# Patient Record
Sex: MALE
[De-identification: names, ages, dates, MRNs, and addresses within clinical notes are randomized per-mention and may not be internally consistent; named-entity substitution may affect disease eponyms.]

---

## 2019-04-06 ENCOUNTER — HOSPITAL ENCOUNTER (EMERGENCY)
Dept: HOSPITAL 56 - MW.ED | Age: 34
Discharge: HOME | End: 2019-04-06
Payer: COMMERCIAL
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
## 2019-04-06 DIAGNOSIS — S49.92XA: ICD-10-CM

## 2019-04-06 DIAGNOSIS — S89.92XA: Primary | ICD-10-CM
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
## 2019-04-06 NOTE — CR
INDICATION:



Pain.



TECHNIQUE:



Three views of the left shoulder.



FINDINGS:



No acute fracture dislocation. No erosion. Old fracture deformity distal 

3rd left clavicle.



Impression :



Old left clavicular fracture. The examination is otherwise negative.



Dictated by Dave Sifuentes MD @ Apr 6 2019  4:09PM



Signed by Dr. Dave Sifuentes @ Apr 6 2019  4:10PM

## 2019-04-06 NOTE — CR
INDICATION:



Pain.



TECHNIQUE:



Three views of the left knee.



FINDINGS:



No fracture, dislocation, erosion, or effusion.



IMPRESSION:



Normal three-view left knee.



Dictated by Dave Sifuentes MD @ Apr 6 2019  4:09PM



Signed by Dr. Dave Sifuentes @ Apr 6 2019  4:09PM
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
## 2021-06-27 NOTE — CR
Indication:



 Trauma 



Technique:



 Three-views of the left elbow 



Findings :



 Minimally distracted olecranon fracture which is otherwise aligned. Soft 

tissue swelling/defect. Elbow effusion. No additional fractures seen.



Dictated by Dahiana Cornell MD @ 6/27/2021 4:03:28 PM



Signed by Dr. Dahiana Cornell @ Jun 27 2021  4:03PM

## 2021-06-28 NOTE — [id number]
Post Anesthesia Note





- EVALUATION WITHIN 48HRS OF ANESTHETIC


Vital Signs in Normal Range: Yes


Patient Participated in Evaluation: Yes


Respiratory Function Stable: Yes


Airway Patent: Yes


Cardiovascular Function Stable: Yes


Hydration Status Stable: Yes


Pain Control Satisfactory: Yes


Nausea and Vomiting Control Satisfactory: Yes


Mental Status Recovered: Yes


Vital Signs: 


                                Last Vital Signs











Temp  37 C   06/28/21 16:46


 


Pulse  82   06/28/21 16:56


 


Resp  10 L  06/28/21 16:56


 


BP  109/64   06/28/21 16:56


 


Pulse Ox  94 L  06/28/21 16:56

## 2021-06-28 NOTE — PCM.PREANE
Preanesthetic Assessment





- Anesthesia/Transfusion/Family Hx


Anesthesia History: Prior Anesthesia Without Reaction


Family History of Anesthesia Reaction: No


Transfusion History: No Prior Transfusion(s)





- Review of Systems


General: No Symptoms


Pulmonary: No Symptoms


Cardiovascular: No Symptoms


Gastrointestinal: No Symptoms


Neurological: No Symptoms


Other: Reports: None





- Physical Assessment


NPO Status Date: 06/28/21


NPO Status Time: 00:00


Height: 6 ft


Weight: 210 lb


ASA Class: 1


Mental Status: Alert & Oriented x3


Airway Class: Mallampati = 2


Dentition: Reports: Normal Dentition


ROM/Head Extension: Full


Lungs: Clear to Auscultation, Normal Respiratory Effort


Cardiovascular: Regular Rate, Regular Rhythm





- Lab


Values: 





                             Laboratory Last Values











SARS-CoV-2 RNA ([facility])  NEGATIVE  (NEGATIVE)   06/28/21  13:05    














- Allergies


Allergies/Adverse Reactions: 


                                    Allergies











Allergy/AdvReac Type Severity Reaction Status Date / Time


 


No Known Allergies Allergy   Verified 06/28/21 11:14














- Blood


Blood Available: No





- Acknowledgements


Anesthesia Type Planned: General Anesthesia, Regional Block


Pt an Appropriate Candidate for the Planned Anesthesia: Yes


Alternatives and Risks of Anesthesia Discussed w Pt/Guardian: Yes


Pt/Guardian Understands and Agrees with Anesthesia Plan: Yes





PreAnesthesia Questionnaire





- Past Health History


Medical/Surgical History: Denies Medical/Surgical History


HEENT History: Reports: Other (See Below)


Other HEENT History: wears glasses


Cardiovascular History: Reports: None


Respiratory History: Reports: None


Gastrointestinal History: Reports: GERD


Genitourinary History: Reports: None


OB/GYN History: 


Musculoskeletal History: Reports: Fracture


Other Musculoskeletal History: hx fx clavicle, left hand, pelvis, right wrist


Neurological History: Reports: Concussion


Psychiatric History: Reports: None


Endocrine/Metabolic History: Reports: None


Hematologic History: Reports: None


Immunologic History: Reports: None


Oncologic (Cancer) History: Reports: None


Dermatologic History: Reports: None





- Infectious Disease History


Infectious Disease History: Reports: None





- Past Surgical History


Head Surgeries/Procedures: Reports: None


HEENT Surgical History: Reports: None


Cardiovascular Surgical History: Reports: None


Respiratory Surgical History: Reports: None


GI Surgical History: Reports: None


Male  Surgical History: Reports: Vasectomy


Endocrine Surgical History: Reports: None


Neurological Surgical History: Reports: None


Musculoskeletal Surgical History: Reports: Arthroscopic Knee


Other Musculoskeletal Surgeries/Procedures:: [place] ACL reconstruction


Oncologic Surgical History: Reports: None


Dermatological Surgical History: Reports: None





- SUBSTANCE USE


Tobacco Use Status *Q: Never Tobacco User





- HOME MEDS


Home Medications: 


                                    Home Meds





Omeprazole 20 mg PO DAILY 06/27/21 [History]











- CURRENT (IN HOUSE) MEDS


Current Meds: 





                               Current Medications





Albuterol (Albuterol 0.083% 2.5 Mg/3 Ml Neb Soln)  2.5 mg NEB ONETIME PRN


   PRN Reason: Wheezing


Droperidol (Droperidol 5 Mg/2 Ml Sdv)  0.625 mg IVPUSH ONETIME PRN


   PRN Reason: Nausea/Vomiting


Fentanyl (Fentanyl 100 Mcg/2 Ml Sdv)  50 mcg IVPUSH Q5M PRN


   PRN Reason: Pain (mild 1-3)


Hydromorphone HCl (Hydromorphone 2 Mg/Ml Syringe)  0.5 mg IVPUSH Q10M PRN


   PRN Reason: Pain (moderate 4-6)


Lactated Ringer's (Ringers, Lactated)  1,000 mls @ 100 mls/hr IV ASDIRECTED TRACIE


Cefazolin Sodium/Dextrose 2 gm (/ Premix)  50 mls @ 100 mls/hr IV ONCALL TRACIE


Metoclopramide HCl (Metoclopramide 10 Mg/2 Ml Sdv)  10 mg IVPUSH ONETIME PRN


   PRN Reason: Nausea/Vomiting


Morphine Sulfate (Morphine 10 Mg/Ml Syringe)  2 mg IVPUSH Q10M PRN


   PRN Reason: Pain (severe 7-10)


Naloxone HCl (Naloxone 0.4 Mg/Ml Syringe)  0.1 mg IVPUSH ASDIRECTED PRN


   PRN Reason: Respiratory Depression


Ondansetron HCl (Ondansetron 4 Mg/2 Ml Sdv)  4 mg IVPUSH ONETIME PRN


   PRN Reason: Nausea/Vomiting





Discontinued Medications





Bupivacaine HCl (Bupivacaine 0.5% 30 Ml Sdv) Confirm Administered Dose 30 ml 

.ROUTE .STK-MED ONE


   Stop: 06/28/21 11:42


Dexamethasone (Dexamethasone 4 Mg/Ml 5 Ml Mdv) Confirm Administered Dose 20 mg 

.ROUTE .STK-MED ONE


   Stop: 06/28/21 11:43


Fentanyl (Fentanyl 100 Mcg/2 Ml Sdv) Confirm Administered Dose 100 mcg .ROUTE 

.STK-MED ONE


   Stop: 06/28/21 13:32

## 2021-06-28 NOTE — PCM.POSTAN
POST ANESTHESIA ASSESSMENT





- MENTAL STATUS


Mental Status: Alert, Oriented





- VITAL SIGNS


Vital Signs: 


                                Last Vital Signs











Temp  37 C   06/28/21 16:46


 


Pulse  82   06/28/21 16:56


 


Resp  10 L  06/28/21 16:56


 


BP  109/64   06/28/21 16:56


 


Pulse Ox  94 L  06/28/21 16:56














- RESPIRATORY


Respiratory Status: Respiratory Rate WNL, Airway Patent, O2 Saturation Stable





- CARDIOVASCULAR


CV Status: Pulse Rate WNL, Blood Pressure Stable





- GASTROINTESTINAL


GI Status: No Symptoms





- POST OP HYDRATION


Hydration Status: Adequate & Stable

## 2021-06-28 NOTE — PCM.SN.2
- Free Text/Narrative


Note: 





SEE ANESTHESIA RECORD:





After obtaining informed consent from patient, and performing a block timeout, 

patient was minimally sedated with 100mcg fentanyl IV while using full 

monitoring (BP,SPO2, ECG)





Following application of chlorhexidine an US probe was used to identify the 

patients left subclavian artery and the corresponding medial, lateral and 

anterior trunks of the brachial plexus. The skin was localized with 1% lidocaine

and a 20g echogenic block needle advance under direct visualization to a 

position inferior to the subclavian artery. After negative aspiration for blood,

10cc of 0.5% Bupiv was injected in 2cc increments. The needle tip was then 

repositioned superior to the artery and another 10cc Bupivicaine injected in 2cc

increments. Patient tolerated the procedure well and was subsequently taken to 

the OR for ORIF of left elbow.





[id number]





Shukri Barrera MD


6/28/2021

## 2021-06-29 NOTE — CR
INDICATION:



Elbow fracture.



COMPARISON:



X-rays 06/27/2021.



FINDINGS:



Intraoperative fluoroscopic support provided orthopedic service for ORIF of 

previously seen proximal ulna fracture. Alignment is anatomic. Remaining 

osseous structures are intact. A total of 3 seconds fluoroscopy time was 

utilized. Please see the operative note for additional details.



Dictated by Arthur Murray MD @ 6/29/2021 2:50:26 PM



Signed by Dr. Arthur Murray @ Jun 29 2021  2:50PM

## 2024-09-05 NOTE — PCM.OPNOTE
- General Post-Op/Procedure Note


Date of Surgery/Procedure: 06/28/21


Operative Procedure(s): (1) Open reduction and internal fixation of left 

olecranon fracture.  (2) Irrigation and debridement of left grade 2 open 

olecranon fracture


Findings: 


Grade 2 open left olecranon fracture with laceration communicating with the 

fracture site, no gross contamination.


Pre Op Diagnosis: Left grade 2 open olecranon fracture


Post-Op Diagnosis: Left grade 2 open olecranon fracture


Anesthesia Technique: General LMA, Regional Block


Primary Surgeon: Negro Zheng


Assistant: Lottie Ruiz


Reason Assistant Was Necessary: 


Retraction and positioning during surgery


Pathology: 


None


EBL in mLs: 5


Complications: None


Free Text/Narrative:: 


Patient is a 35-year-old right-hand-dominant male who sustained a grade 2 open 

left olecranon fracture doing motocross yesterday evening.  Patient was seen in 

the emergency room and had initial irrigation and debridement of the wound with 

loose closure.  He was made sure that he was up-to-date on his tetanus shot and 

received Ancef as well as ceftriaxone for antibiotics for the open fracture.  He

was placed in a posterior long-arm splint.  He was seen today and medically 

cleared for surgery.  The risks and benefits of surgery were discussed with the 

patient.  All questions were answered.  Patient consented to proceed with 

surgery.





Patient was taken to the operating room after he received a regional block.  YOCASTA zuñiga was placed in a supine position and underwent general anesthesia by LMA. 

He had bolsters placed under his left shoulder back and hip so he was slightly 

elevated on his left side.  The left upper extremity was then scrubbed with 

Betadine and then prepped with Betadine.  The left upper extremities prepped 

draped in usual sterile manner.  The 3 cm open laceration was debrided sharply 

with a scalpel blade.  The soft tissues were debrided sharply with a scalpel 

blade.  The fracture was identified and communicated and was curetted to remove 

any possible foreign body but no gross contamination was noted.  The wound was 

then irrigated with 3000 cc of normal saline solution.





The open wound was distal to the olecranon so that the wound was extended 

proximally past the tip of the olecranon more to the lateral side so was not 

directly posterior over the tip of the olecranon.  The tissues were elevated 

around the fracture site and distally along the shaft to allow for a plate.  A 

drill hole was made distal to the fracture in the lateral aspect of the ulna 

shaft for pointed reduction clamp and the fracture was reduced with compression 

through a reduction clamp.  A 7 hole one third tubular plate was then customized

into a hook plate and placed along the posterior border of the ulna olecranon 

with the spikes placed into the triceps tendon.  A bicortical screw was placed 

in the most distal hole.  A lag screw was then placed in the third proximal hole

across the fracture while it was compressed.  C arm confirmed anatomic reduction

of the fracture.  An additional bicortical screw was placed through the second 

most distal hole.  C arm again confirmed anatomic reduction and good position of

the plate and screws.  Wounds were again irrigated.  The fascia and bursa were 

closed over the plate with interrupted #1 PDS suture.  Skin was closed with 

full-thickness simple and horizontal mattress 3-0 nylon sutures.  Sterile 

dressing was applied and then the patient was replaced in a posterior long-arm 

splint.  Patient was a come to the recovery in stable condition.





Patient will have ibuprofen and Vicodin for pain medication.


Augmentin 875 mg p.o. twice daily x7 days.


Venous thromboembolism prophylaxis is not indicated for an upper extremity 

procedure.


Restrictions: Patient is nonweightbearing on his left upper extremity for 3 

months.  He will follow up in 3 days for a wound check because this was an open 

fracture.  He may have general active and passive range of motion of the elbow 

from 0 to 100 degrees, but should not Hyperflex the elbow past 100 degrees which

will place additional strain on the fracture repair.  At 6 weeks, he can begin 

full range of motion but not do any heavy lifting and will be released to full 

activities at 3 months assuming everything is healing uneventfully 

radiographically. Patient c/o LLQ pain x 10 days w pain radiating to his L flank. Patient reports having this a few years ago & got an US done that was inconclusive. Pt. Denies  symptoms, n/v/d, CP & SOB.